# Patient Record
Sex: MALE | Race: WHITE
[De-identification: names, ages, dates, MRNs, and addresses within clinical notes are randomized per-mention and may not be internally consistent; named-entity substitution may affect disease eponyms.]

---

## 2018-12-05 ENCOUNTER — HOSPITAL ENCOUNTER (EMERGENCY)
Dept: HOSPITAL 25 - ED | Age: 23
LOS: 1 days | Discharge: HOME | End: 2018-12-06
Payer: COMMERCIAL
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
## 2018-12-05 DIAGNOSIS — S01.81XA: Primary | ICD-10-CM
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
## 2018-12-05 PROCEDURE — 90471 IMMUNIZATION ADMIN: CPT

## 2018-12-05 PROCEDURE — 12002 RPR S/N/AX/GEN/TRNK2.6-7.5CM: CPT

## 2018-12-05 PROCEDURE — 90715 TDAP VACCINE 7 YRS/> IM: CPT

## 2018-12-05 PROCEDURE — 99282 EMERGENCY DEPT VISIT SF MDM: CPT

## 2018-12-06 VITALS — DIASTOLIC BLOOD PRESSURE: 64 MMHG | SYSTOLIC BLOOD PRESSURE: 115 MMHG

## 2023-07-19 NOTE — ED
Laceration/Wound HPI





- HPI Summary


HPI Summary: 





Patient complains of laceration to chin after falling during pushup.  Denies 

any other pain, injury, symptoms, oral trauma.  Tetanus status unknown.





- History of Current Complaint


Stated Complaint: FACIAL LAC


Time Seen by Provider: 12/05/18 23:59


Hx Obtained From: Patient


Mechanism of Injury: Sharp/Blunt Trauma


Onset Severity: Mild


Current Severity: Mild


Pain Intensity: 2


Pain Scale Used: 0-10 Numeric


Associated Signs & Symptoms: Negative





- Allergy/Home Medications


Allergies/Adverse Reactions: 


 Allergies











Allergy/AdvReac Type Severity Reaction Status Date / Time


 


No Known Allergies Allergy   Verified 12/05/18 22:37














PMH/Surg Hx/FS Hx/Imm Hx


Endocrine/Hematology History: 


   Denies: Hx Diabetes, Hx Thyroid Disease


Cardiovascular History: 


   Denies: Hx Hypertension


Respiratory History: 


   Denies: Hx Asthma, Hx Chronic Obstructive Pulmonary Disease (COPD)


GI History: 


   Denies: Hx Ulcer


 History: 


   Denies: Hx Dialysis


Neurological History: 


   Denies: Hx CVA


Infectious Disease History: No


Infectious Disease History: 


   Denies: Hx Hepatitis, Hx Human Immunodeficiency Virus (HIV), Traveled 

Outside the  in Last 30 Days





- Social History


Alcohol Use: None


Substance Use Type: Reports: None


Smoking Status (MU): Never Smoked Tobacco





Review of Systems


Constitutional: Negative


Eyes: Negative


ENT: Negative


Cardiovascular: Negative


Respiratory: Negative


Gastrointestinal: Negative


Genitourinary: Negative


Musculoskeletal: Negative


Skin: Other


Neurological: Negative


Psychological: Normal


All Other Systems Reviewed And Are Negative: Yes





Physical Exam





- Summary


Physical Exam Summary: 





Full range of motion of jaw.  No oral trauma to tongue or teeth noted.  No 

facial trauma.  No pain with palpation of head, neck.  Neuro exam normal.


Triage Information Reviewed: Yes


Vital Signs On Initial Exam: 


 Initial Vitals











Temp Pulse Resp BP Pulse Ox


 


 97.5 F   76   16   121/70   97 


 


 12/05/18 22:33  12/05/18 22:33  12/05/18 22:33  12/05/18 22:33  12/05/18 22:33











Vital Signs Reviewed: Yes


Appearance: Positive: Well-Appearing


Skin: Positive: Warm


Head/Face: Positive: Normal Head/Face Inspection


Eyes: Positive: Normal


ENT: Positive: Normal ENT inspection


Dental: Negative: Dental Fracture @, Bleeding


Neck: Positive: Supple


Respiratory/Lung Sounds: Positive: Clear to Auscultation


Cardiovascular: Positive: Normal


Abdomen Description: Positive: Nontender


Musculoskeletal: Positive: Normal


Neurological: Positive: Normal


Psychiatric: Positive: Normal


AVPU Assessment: Alert





- [place] Coma Scale


Best Eye Response: 4 - Spontaneous


Best Motor Response: 6 - Obeys Commands


Best Verbal Response: 5 - Oriented


Coma Scale Total: 15





Procedures





- Laceration/Wound Repair


  ** 1


Location: face


Description: Linear


Anesthesia: Local, 1.0%


Length, Depth and Shape: 4cm x 1cm


Betadine Prep?: Yes


Irrigated w/ Saline (ccs): 100


Laceration/Wound Explored: clean


Debridement: minimal


Number of Sutures: 6 - 6.0 ethilon


Layer Closure?: No


Sterile Dressing Applied?: No





Diagnostics





- Vital Signs


 Vital Signs











  Temp Pulse Resp BP Pulse Ox


 


 12/05/18 22:33  97.5 F  76  16  121/70  97














- Laboratory


Lab Statement: Any lab studies that have been ordered have been reviewed, and 

results considered in the medical decision making process.





Laceration Repair Course/Dx





- Course


Course Of Treatment: Patient complains of laceration to chin after falling 

during pushup.  Denies any other pain, injury, symptoms, oral trauma.  Tetanus 

status unknown.  Physical exam:Full range of motion of jaw.  No oral trauma to 

tongue or teeth noted.  No facial trauma.  No pain with palpation of head, 

neck.  Neuro exam normal.  Wound sutured..  Rx for Keflex





- Clinical Impression


Provider Diagnoses: 


 Laceration








Discharge





- Sign-Out/Discharge


Documenting (check all that apply): Patient Departure





- Discharge Plan


Condition: Stable


Disposition: HOME


Prescriptions: 


Cephalexin CAP* [Keflex CAP*] 500 mg PO TID 5 Days #15 cap


Patient Education Materials:  Care For Your Stitches (ED), Laceration (ED), 

Facial Laceration (ED)


Referrals: 


Ludwin Haynes MD [Primary Care Provider] - 


Additional Instructions: 


Sutures out in 5 days.  Take antibiotics as directed.  May wash wound with warm 

running water and soap.  Do not submerge.  Follow-up with primary care.  Return 

to the ED for any new or worsening symptoms





- Billing Disposition and Condition


Condition: STABLE


Disposition: Home 1-2 drinks